# Patient Record
Sex: MALE | Race: WHITE | URBAN - METROPOLITAN AREA
[De-identification: names, ages, dates, MRNs, and addresses within clinical notes are randomized per-mention and may not be internally consistent; named-entity substitution may affect disease eponyms.]

---

## 2022-11-29 ENCOUNTER — OFFICE (OUTPATIENT)
Dept: URBAN - METROPOLITAN AREA CLINIC 113 | Facility: CLINIC | Age: 72
Setting detail: OPHTHALMOLOGY
End: 2022-11-29
Payer: MEDICARE

## 2022-11-29 DIAGNOSIS — H52.4: ICD-10-CM

## 2022-11-29 DIAGNOSIS — H35.54: ICD-10-CM

## 2022-11-29 DIAGNOSIS — H50.22: ICD-10-CM

## 2022-11-29 DIAGNOSIS — H52.03: ICD-10-CM

## 2022-11-29 DIAGNOSIS — Z96.1: ICD-10-CM

## 2022-11-29 DIAGNOSIS — H25.011: ICD-10-CM

## 2022-11-29 PROBLEM — H53.032: Status: ACTIVE | Noted: 2022-11-29

## 2022-11-29 PROCEDURE — 92014 COMPRE OPH EXAM EST PT 1/>: CPT | Performed by: OPTOMETRIST

## 2022-11-29 PROCEDURE — 92250 FUNDUS PHOTOGRAPHY W/I&R: CPT | Performed by: OPTOMETRIST

## 2022-11-29 PROCEDURE — 92015 DETERMINE REFRACTIVE STATE: CPT | Performed by: OPTOMETRIST

## 2022-11-29 ASSESSMENT — REFRACTION_MANIFEST
OD_SPHERE: +1.50
OS_AXIS: 075
OD_AXIS: 105
OD_ADD: +2.25
OS_AXIS: 075
OD_VA1: 20/20
OS_CYLINDER: -0.75
OS_VA1: 20/30
OD_CYLINDER: -0.75
OS_ADD: +2.25
OD_ADD: +2.25
OD_SPHERE: +1.75
OS_VA1: 20/30
OD_AXIS: 105
OS_ADD: +2.25
OS_SPHERE: +1.50
OD_VA1: 20/20-
OS_SPHERE: +1.50
OD_CYLINDER: -1.00
OS_CYLINDER: -0.75

## 2022-11-29 ASSESSMENT — AXIALLENGTH_DERIVED
OS_AL: 22.6424
OS_AL: 22.6874
OD_AL: 22.5087
OD_AL: 22.6424
OD_AL: 22.5977
OS_AL: 22.6424

## 2022-11-29 ASSESSMENT — SPHEQUIV_DERIVED
OS_SPHEQUIV: 1.125
OD_SPHEQUIV: 1.25
OD_SPHEQUIV: 1.5
OD_SPHEQUIV: 1.125
OS_SPHEQUIV: 1
OS_SPHEQUIV: 1.125

## 2022-11-29 ASSESSMENT — KERATOMETRY
OD_K1POWER_DIOPTERS: 44.75
OD_K2POWER_DIOPTERS: 45.25
OS_K2POWER_DIOPTERS: 45.00
OS_K1POWER_DIOPTERS: 45.00
OD_AXISANGLE_DEGREES: 027
METHOD_AUTO_MANUAL: AUTO
OS_AXISANGLE_DEGREES: 090

## 2022-11-29 ASSESSMENT — REFRACTION_CURRENTRX
OD_OVR_VA: 20/
OD_ADD: +2.00
OS_ADD: +3.00
OD_AXIS: 180
OD_VPRISM_DIRECTION: BF
OS_OVR_VA: 20/
OS_CYLINDER: 0.00
OS_VPRISM_DIRECTION: BF
OS_AXIS: 180
OS_SPHERE: +1.00
OD_CYLINDER: 0.00
OD_SPHERE: +0.75

## 2022-11-29 ASSESSMENT — CONFRONTATIONAL VISUAL FIELD TEST (CVF)
OD_FINDINGS: FULL
OS_FINDINGS: FULL

## 2022-11-29 ASSESSMENT — TONOMETRY
OS_IOP_MMHG: 13
OD_IOP_MMHG: 13

## 2022-11-29 ASSESSMENT — REFRACTION_AUTOREFRACTION
OD_SPHERE: +2.25
OS_AXIS: 076
OD_AXIS: 105
OS_CYLINDER: -1.00
OS_SPHERE: +1.50
OD_CYLINDER: -1.50

## 2022-11-29 ASSESSMENT — VISUAL ACUITY
OS_BCVA: 20/20
OD_BCVA: 20/30

## 2023-09-27 PROBLEM — Z00.00 ENCOUNTER FOR PREVENTIVE HEALTH EXAMINATION: Status: ACTIVE | Noted: 2023-09-27

## 2023-09-29 ENCOUNTER — APPOINTMENT (OUTPATIENT)
Dept: ORTHOPEDIC SURGERY | Facility: CLINIC | Age: 73
End: 2023-09-29

## 2023-10-06 ENCOUNTER — APPOINTMENT (OUTPATIENT)
Dept: ORTHOPEDIC SURGERY | Facility: CLINIC | Age: 73
End: 2023-10-06
Payer: MEDICARE

## 2023-10-06 VITALS — HEIGHT: 67 IN | WEIGHT: 190 LBS | BODY MASS INDEX: 29.82 KG/M2

## 2023-10-06 DIAGNOSIS — Z78.9 OTHER SPECIFIED HEALTH STATUS: ICD-10-CM

## 2023-10-06 DIAGNOSIS — M77.8 OTHER ENTHESOPATHIES, NOT ELSEWHERE CLASSIFIED: ICD-10-CM

## 2023-10-06 DIAGNOSIS — M19.041 PRIMARY OSTEOARTHRITIS, RIGHT HAND: ICD-10-CM

## 2023-10-06 PROCEDURE — 99203 OFFICE O/P NEW LOW 30 MIN: CPT

## 2023-10-06 PROCEDURE — 73130 X-RAY EXAM OF HAND: CPT | Mod: RT

## 2023-10-06 RX ORDER — LEVOTHYROXINE SODIUM 100 UG/1
100 CAPSULE ORAL
Refills: 0 | Status: ACTIVE | COMMUNITY

## 2023-11-03 ENCOUNTER — APPOINTMENT (OUTPATIENT)
Dept: ORTHOPEDIC SURGERY | Facility: CLINIC | Age: 73
End: 2023-11-03

## 2023-12-05 ENCOUNTER — NON-APPOINTMENT (OUTPATIENT)
Age: 73
End: 2023-12-05

## 2023-12-12 ENCOUNTER — APPOINTMENT (OUTPATIENT)
Dept: UROLOGY | Facility: CLINIC | Age: 73
End: 2023-12-12
Payer: MEDICARE

## 2023-12-12 VITALS
RESPIRATION RATE: 16 BRPM | HEIGHT: 66 IN | OXYGEN SATURATION: 100 % | WEIGHT: 185 LBS | DIASTOLIC BLOOD PRESSURE: 80 MMHG | SYSTOLIC BLOOD PRESSURE: 123 MMHG | HEART RATE: 75 BPM | BODY MASS INDEX: 29.73 KG/M2

## 2023-12-12 PROCEDURE — 99205 OFFICE O/P NEW HI 60 MIN: CPT

## 2023-12-19 ENCOUNTER — APPOINTMENT (OUTPATIENT)
Dept: UROLOGY | Facility: CLINIC | Age: 73
End: 2023-12-19
Payer: MEDICARE

## 2023-12-19 VITALS
DIASTOLIC BLOOD PRESSURE: 75 MMHG | WEIGHT: 185 LBS | HEART RATE: 81 BPM | RESPIRATION RATE: 16 BRPM | BODY MASS INDEX: 29.73 KG/M2 | SYSTOLIC BLOOD PRESSURE: 117 MMHG | OXYGEN SATURATION: 96 % | HEIGHT: 66 IN

## 2023-12-19 DIAGNOSIS — Z63.4 DISAPPEARANCE AND DEATH OF FAMILY MEMBER: ICD-10-CM

## 2023-12-19 DIAGNOSIS — N52.9 MALE ERECTILE DYSFUNCTION, UNSPECIFIED: ICD-10-CM

## 2023-12-19 DIAGNOSIS — F17.200 NICOTINE DEPENDENCE, UNSPECIFIED, UNCOMPLICATED: ICD-10-CM

## 2023-12-19 PROCEDURE — 99213 OFFICE O/P EST LOW 20 MIN: CPT

## 2023-12-19 RX ORDER — TADALAFIL 20 MG/1
20 TABLET ORAL
Qty: 6 | Refills: 6 | Status: ACTIVE | COMMUNITY
Start: 2023-12-19 | End: 1900-01-01

## 2023-12-19 RX ORDER — METHYLPREDNISOLONE 4 MG/1
4 TABLET ORAL
Qty: 1 | Refills: 0 | Status: COMPLETED | COMMUNITY
Start: 2023-10-06 | End: 2023-12-19

## 2023-12-19 RX ORDER — LEVOTHYROXINE SODIUM 88 UG/1
88 CAPSULE ORAL
Refills: 0 | Status: ACTIVE | COMMUNITY
Start: 2023-12-19

## 2023-12-19 SDOH — SOCIAL STABILITY - SOCIAL INSECURITY: DISSAPEARANCE AND DEATH OF FAMILY MEMBER: Z63.4

## 2023-12-19 NOTE — HISTORY OF PRESENT ILLNESS
[FreeTextEntry1] : Pt with c/o ED x 4-5 years. He has tried Viagra 100 mg, not effective, with side effect HA. Libido good. H/o back surgery, hernia surgery. 
- - -

## 2023-12-19 NOTE — ASSESSMENT
[FreeTextEntry1] : ED  Discussed treatment options for ED with PDE5I, Vaccuum device, ICI, penile implant. Pt has only tried Sildenafil 100 mg and it is not effective. He would like to try Tadalafil.  Lab: Testosterone Will schedule TTM in 2 weeks Rx: Tadlafil 20 mg Q36H PRN If Tadalfil not effective he would be interested in starting Trimix. Pt instructed that Trimix can be used [facility], alternate sites, will have to pay out-of-pocket, side effect: Priapism.  Schedule TTM 2 weeks

## 2023-12-26 ENCOUNTER — NON-APPOINTMENT (OUTPATIENT)
Age: 73
End: 2023-12-26

## 2023-12-28 LAB
TESTOST FREE SERPL-MCNC: 3.9 PG/ML
TESTOST SERPL-MCNC: 606 NG/DL

## 2024-01-02 ENCOUNTER — APPOINTMENT (OUTPATIENT)
Dept: UROLOGY | Facility: CLINIC | Age: 74
End: 2024-01-02
Payer: MEDICARE

## 2024-01-02 PROCEDURE — 99441: CPT

## 2024-01-02 NOTE — ASSESSMENT
[FreeTextEntry1] : ED  Pt would like to continue with Tadalafil 20 mg PRN for now He will f/u as needed

## 2024-01-02 NOTE — HISTORY OF PRESENT ILLNESS
[Home] : at home, [unfilled] , at the time of the visit. [Medical Office: ([facility])___] : at the medical office located in  [Verbal consent obtained from patient] : the patient, [unfilled] [FreeTextEntry1] : Pt with Ed states he has tried Tadalafil twice with better response as compared to Sildenafil. He states it makes him feel weird although he cannot describe exactly what he meant. He denies muscle ache, nasal congestion, dizziness. He mentioned that he has a decreased sex drive and is not sure if he wants to pursue other options to treat his ED.

## 2024-10-21 ENCOUNTER — OFFICE (OUTPATIENT)
Dept: URBAN - METROPOLITAN AREA CLINIC 113 | Facility: CLINIC | Age: 74
Setting detail: OPHTHALMOLOGY
End: 2024-10-21
Payer: MEDICARE

## 2024-10-21 DIAGNOSIS — H52.03: ICD-10-CM

## 2024-10-21 DIAGNOSIS — H52.4: ICD-10-CM

## 2024-10-21 DIAGNOSIS — H50.22: ICD-10-CM

## 2024-10-21 DIAGNOSIS — H35.54: ICD-10-CM

## 2024-10-21 DIAGNOSIS — H25.011: ICD-10-CM

## 2024-10-21 PROCEDURE — 92014 COMPRE OPH EXAM EST PT 1/>: CPT | Performed by: OPTOMETRIST

## 2024-10-21 PROCEDURE — 92134 CPTRZ OPH DX IMG PST SGM RTA: CPT | Performed by: OPTOMETRIST

## 2024-10-21 PROCEDURE — 92015 DETERMINE REFRACTIVE STATE: CPT | Performed by: OPTOMETRIST

## 2024-10-21 ASSESSMENT — REFRACTION_CURRENTRX
OD_AXIS: 180
OS_OVR_VA: 20/
OS_SPHERE: +1.00
OD_VPRISM_DIRECTION: BF
OD_SPHERE: +0.75
OD_CYLINDER: 0.00
OD_VPRISM_DIRECTION: BF
OS_AXIS: 180
OD_SPHERE: +0.75
OS_ADD: +3.00
OS_VPRISM_DIRECTION: BF
OD_ADD: +2.25
OS_OVR_VA: 20/
OD_OVR_VA: 20/
OS_CYLINDER: 0.00
OD_ADD: +2.00
OD_OVR_VA: 20/
OS_VPRISM_DIRECTION: BF
OS_ADD: +3.00
OS_SPHERE: +1.00

## 2024-10-21 ASSESSMENT — REFRACTION_MANIFEST
OD_SPHERE: +1.50
OS_ADD: +2.00
OS_VA1: 20/30
OS_CYLINDER: -0.50
OS_CYLINDER: -0.75
OS_AXIS: 075
OD_CYLINDER: -0.75
OD_VA1: 20/20
OD_SPHERE: +1.50
OS_SPHERE: +1.00
OD_AXIS: 105
OS_AXIS: 075
OD_ADD: +2.25
OD_CYLINDER: -0.75
OD_ADD: +2.00
OS_SPHERE: +1.50
OS_ADD: +2.25
OD_VA1: 20/20-2
OS_VA1: 20/30
OD_AXIS: 105

## 2024-10-21 ASSESSMENT — TONOMETRY
OS_IOP_MMHG: 15
OD_IOP_MMHG: 18

## 2024-10-21 ASSESSMENT — KERATOMETRY
OS_K1POWER_DIOPTERS: 44.75
OD_K2POWER_DIOPTERS: 45.50
METHOD_AUTO_MANUAL: AUTO
OS_K2POWER_DIOPTERS: 45.50
OS_AXISANGLE_DEGREES: 051
OD_K1POWER_DIOPTERS: 45.00
OD_AXISANGLE_DEGREES: 011

## 2024-10-21 ASSESSMENT — REFRACTION_AUTOREFRACTION
OS_SPHERE: +1.50
OS_CYLINDER: -0.75
OD_AXIS: 100
OD_SPHERE: +2.00
OS_AXIS: 082
OD_CYLINDER: -1.25

## 2024-10-21 ASSESSMENT — CONFRONTATIONAL VISUAL FIELD TEST (CVF)
OS_FINDINGS: FULL
OD_FINDINGS: FULL

## 2024-10-21 ASSESSMENT — VISUAL ACUITY
OS_BCVA: 20/20-
OD_BCVA: 20/40+